# Patient Record
Sex: MALE | Race: WHITE | NOT HISPANIC OR LATINO | Employment: OTHER | URBAN - METROPOLITAN AREA
[De-identification: names, ages, dates, MRNs, and addresses within clinical notes are randomized per-mention and may not be internally consistent; named-entity substitution may affect disease eponyms.]

---

## 2023-01-08 ENCOUNTER — APPOINTMENT (EMERGENCY)
Dept: RADIOLOGY | Facility: HOSPITAL | Age: 23
End: 2023-01-08

## 2023-01-08 ENCOUNTER — HOSPITAL ENCOUNTER (EMERGENCY)
Facility: HOSPITAL | Age: 23
Discharge: HOME | End: 2023-01-08
Attending: EMERGENCY MEDICINE

## 2023-01-08 VITALS
HEART RATE: 73 BPM | RESPIRATION RATE: 20 BRPM | TEMPERATURE: 97.5 F | DIASTOLIC BLOOD PRESSURE: 89 MMHG | OXYGEN SATURATION: 99 % | SYSTOLIC BLOOD PRESSURE: 135 MMHG
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
## 2023-01-08 DIAGNOSIS — S53.105A CLOSED DISLOCATION OF LEFT ELBOW, INITIAL ENCOUNTER: Primary | ICD-10-CM

## 2023-01-08 RX ORDER — ONDANSETRON 2 MG/ML
4 INJECTION INTRAMUSCULAR; INTRAVENOUS ONCE
Status: COMPLETED | OUTPATIENT
Start: 2023-01-08 | End: 2023-01-08

## 2023-01-08 RX ORDER — HYDROMORPHONE HCL/PF 1 MG/ML
0.5 SYRINGE (ML) INJECTION ONCE
Status: COMPLETED | OUTPATIENT
Start: 2023-01-08 | End: 2023-01-08

## 2023-01-08 RX ORDER — NAPROXEN 250 MG/1
250 TABLET ORAL 2 TIMES DAILY WITH MEALS
Qty: 20 TABLET | Refills: 0 | Status: SHIPPED | OUTPATIENT
Start: 2023-01-08

## 2023-01-08 RX ORDER — OXYCODONE HYDROCHLORIDE AND ACETAMINOPHEN 5; 325 MG/1; MG/1
1 TABLET ORAL EVERY 4 HOURS PRN
Qty: 7 TABLET | Refills: 0 | Status: SHIPPED | OUTPATIENT
Start: 2023-01-08

## 2023-01-08 RX ORDER — LIDOCAINE HYDROCHLORIDE 20 MG/ML
10 INJECTION, SOLUTION EPIDURAL; INFILTRATION; INTRACAUDAL; PERINEURAL ONCE
Status: COMPLETED | OUTPATIENT
Start: 2023-01-08 | End: 2023-01-08

## 2023-01-08 RX ADMIN — ONDANSETRON 4 MG: 2 INJECTION INTRAMUSCULAR; INTRAVENOUS at 18:18

## 2023-01-08 RX ADMIN — LIDOCAINE HYDROCHLORIDE 10 ML: 20 INJECTION, SOLUTION EPIDURAL; INFILTRATION; INTRACAUDAL at 18:21

## 2023-01-08 RX ADMIN — HYDROMORPHONE HYDROCHLORIDE 0.5 MG: 1 INJECTION, SOLUTION INTRAMUSCULAR; INTRAVENOUS; SUBCUTANEOUS at 18:19

## 2023-01-08 NOTE — ED PROVIDER NOTES
Pt Name: Yolanda Marsh  MRN: [id number]  [place] 2000  Age/Sex: 25 y o  male  Date of evaluation: 1/8/2023  PCP: No primary care provider on file  CHIEF COMPLAINT    Chief Complaint   Patient presents with   • Arm Injury     Pt was snowboarding today, fell onto L arm - obvious deformity to LUE near elbow  PMS intact below, pain 5/10  HPI    25 y o  male presenting with left elbow pain and deformity  Patient states he was snowboarding today, fell onto an outstretched left arm, had immediate pain and deformity to that elbow  Pain is currently 5 out of 10, dull, worse with moving the arm and at rest   Patient notes difficulty moving the arm at the elbow  He denies numbness, weakness, headache, loss of consciousness, any other pain or injuries  HPI      Past Medical and Surgical History    No past medical history on file  Past Surgical History:   Procedure Laterality Date   • WISDOM TOOTH EXTRACTION  2018       No family history on file  Allergies    Allergies   Allergen Reactions   • Other Other (See Comments)     FRUIT - all fruit causes itchy throat/cough       Home Medications    Prior to Admission medications    Not on File           Review of Systems    Review of Systems   Constitutional: Negative for appetite change, chills and diaphoresis  HENT: Negative for drooling, facial swelling, trouble swallowing and voice change  Respiratory: Negative for apnea, shortness of breath and wheezing  Cardiovascular: Negative for chest pain and leg swelling  Gastrointestinal: Negative for abdominal distention, abdominal pain, diarrhea, nausea and vomiting  Genitourinary: Negative for dysuria and urgency  Musculoskeletal: Positive for arthralgias and joint swelling  Negative for back pain, gait problem and neck pain  Skin: Negative for color change, rash and wound  Neurological: Negative for seizures, speech difficulty, weakness and headaches     Psychiatric/Behavioral: Negative for agitation, behavioral problems and dysphoric mood  The patient is not nervous/anxious  All other systems reviewed and negative  Physical Exam      ED Triage Vitals [01/08/23 1800]   Temperature Pulse Respirations Blood Pressure SpO2   97 5 °F (36 4 °C) 73 20 135/89 99 %      Temp Source Heart Rate Source Patient Position - Orthostatic VS BP Location FiO2 (%)   Oral Monitor Sitting Right arm --      Pain Score       5               Physical Exam  Vitals and nursing note reviewed  Constitutional:       General: He is not in acute distress  Appearance: He is well-developed  He is not ill-appearing, toxic-appearing or diaphoretic  HENT:      Head: Normocephalic and atraumatic  Right Ear: External ear normal       Left Ear: External ear normal       Nose: Nose normal       Mouth/Throat:      Mouth: Mucous membranes are moist       Pharynx: Oropharynx is clear  Eyes:      Conjunctiva/sclera: Conjunctivae normal       Pupils: Pupils are equal, round, and reactive to light  Neck:      Trachea: No tracheal deviation  Cardiovascular:      Rate and Rhythm: Normal rate and regular rhythm  Pulses: Normal pulses  Heart sounds: Normal heart sounds  No murmur heard  Pulmonary:      Effort: Pulmonary effort is normal  No respiratory distress  Breath sounds: Normal breath sounds  No stridor  No wheezing or rales  Abdominal:      General: There is no distension  Palpations: Abdomen is soft  Tenderness: There is no abdominal tenderness  There is no guarding or rebound  Musculoskeletal:         General: Swelling, tenderness, deformity and signs of injury present  Normal range of motion  Cervical back: Normal range of motion and neck supple  No rigidity or tenderness  Comments: Patient's left elbow deformed consistent with fracture versus dislocation, tender to palpation, no breaks in skin, wounds, or other evidence of open fracture at this time  Strength sensation pulse and cap refill intact distal    Skin:     General: Skin is warm and dry  Capillary Refill: Capillary refill takes less than 2 seconds  Findings: No rash  Neurological:      Mental Status: He is alert and oriented to person, place, and time  Psychiatric:         Behavior: Behavior normal          Thought Content: Thought content normal          Judgment: Judgment normal               Diagnostic Results      Labs:    Results Reviewed     None          All labs reviewed and utilized in the medical decision making process    Radiology:    XR forearm 2 views LEFT    (Results Pending)   XR humerus left    (Results Pending)   XR elbow 2 vw left    (Results Pending)   XR elbow 2 views LEFT    (Results Pending)       All radiology studies independently viewed by me and interpreted by the radiologist     Procedure    Orthopedic injury treatment    Date/Time: 1/8/2023 6:30 PM  Performed by: Alaina Frankel MD  Authorized by: Alaina Frankel MD     Patient Location:  ED  [place] Protocol:  Consent: Verbal consent obtained  Risks and benefits: risks, benefits and alternatives were discussed  Consent given by: patient  Time out: Immediately prior to procedure a "time out" was called to verify the correct patient, procedure, equipment, support staff and site/side marked as required  Patient identity confirmed: provided demographic data      Injury location:  Elbow  Location details:  Left elbow  Injury type:  Dislocation  Dislocation type: posterior    Neurovascular status: Neurovascularly intact    Distal perfusion: normal    Neurological function: normal    Range of motion: reduced    Local anesthesia used?: Yes    Anesthesia method: Injection of elbow joint    Local anesthetic:  Lidocaine 2% without epinephrine  Anesthetic total (ml):  10  Manipulation performed?: Yes    Reduction method:  Traction and counter traction  Reduction method:  Traction and counter traction  Reduction method:  Traction and counter traction  Reduction method:  Traction and counter traction  Reduction method:  Traction and counter traction  Reduction method:  Traction and counter traction  Reduction successful?: Yes    Confirmation: Reduction confirmed by x-ray    Immobilization:  Sling  Neurovascular status: Neurovascularly intact    Distal perfusion: normal    Neurological function: normal    Range of motion: normal     After discussion of risks and benefits, hematoma block of the elbow performed with synovial fluid aspirated to confirm placement, reduced without incident  Elbow gently ranged afterwards and ranges smoothly, stable on reassessment, sensation pulse and cap refill intact distal afterwards  ED Course of Care and Re-Assessments      Initial plain films interpreted by myself, consistent with dislocation of the elbow  After discussion of risks and benefits, reduced with analgesia by IV hydromorphone as well as hematoma block of the elbow  Reduction successful without evidence of fracture on postreduction films  On reevaluation, range of motion normalized, strength sensation pulse and cap refill intact distal   Placed in sling by myself  Medications   HYDROmorphone (DILAUDID) injection 0 5 mg (0 5 mg Intravenous Given 1/8/23 1819)   ondansetron (ZOFRAN) injection 4 mg (4 mg Intravenous Given 1/8/23 1818)   lidocaine (PF) (XYLOCAINE-MPF) 2 % injection 10 mL (10 mL Infiltration Given by Other 1/8/23 1821)           FINAL IMPRESSION    Final diagnoses:   Closed dislocation of left elbow, initial encounter   Fall from snowboard, initial encounter         DISPOSITION/PLAN    Presentation as above close dislocation of the left elbow status post fall from snowboard  Vital signs are reassuring, examination markable for deformity  Plain films confirm dislocation, no evidence of fracture noted  On careful history and physical exam, no other acute traumatic injuries    Elbow reduced as above, immobilized in sling, counseled on aftercare and need for follow-up  Discharged with strict return precautions, follow-up with primary care doctor and orthopedics  Time reflects when diagnosis was documented in both MDM as applicable and the Disposition within this note     Time User Action Codes Description Comment    1/8/2023  7:11 PM Raul Vasquez Add [S53 105A] Closed dislocation of left elbow, initial encounter     1/8/2023  7:11 PM Linwood Hammans [A92 276G] Fall from snowboard, initial encounter       ED Disposition     ED Disposition   Discharge    Condition   Stable    Date/Time   Sun Jan 8, 2023  7:11 PM    [place] 89 discharge to home/self care                 Follow-up Information     Follow up With Specialties Details Why Contact Info Additional 2000 [facility] Emergency Department Emergency Medicine Go to  If symptoms worsen 34 [facility] 109 [facility] Emergency Department, [address], 201 [facility] Orthopedic Surgery Call in 1 day Schedule close follow-up for your dislocated elbow [address] [place] [id number]  [address] [place] [address] Specialists [facility], [address] [address] [address], 243 [facility]            PATIENT REFERRED TO:    [address] Emergency Department  34 [facility] [id number] [phone]  Go to   If symptoms worsen    [facility]7 [facility] Specialists [place]  [address] [facility]  Call in 1 day  Schedule close follow-up for your dislocated elbow      DISCHARGE MEDICATIONS:    Discharge Medication List as of 1/8/2023  7:29 PM      START taking these medications    Details   naproxen (NAPROSYN) 250 mg tablet Take 1 tablet (250 mg total) by mouth 2 (two) times a day with meals, Starting Sun 1/8/2023, Print      oxyCODONE-acetaminophen (PERCOCET) 5-325 mg per tablet Take 1 tablet by mouth every 4 (four) hours as needed for severe pain for up to 7 doses Max Daily Amount: 6 tablets, Starting Sun 1/8/2023, Normal                      Jennifer Lew MD    Portions of the record may have been created with voice recognition software  Occasional wrong word or "sound alike" substitutions may have occurred due to the inherent limitations of voice recognition software    Please read the chart carefully and recognize, using context, where substitutions have occurred     Jennifer Lew MD  01/08/23 2588